# Patient Record
Sex: FEMALE | Race: BLACK OR AFRICAN AMERICAN | Employment: UNEMPLOYED | URBAN - METROPOLITAN AREA
[De-identification: names, ages, dates, MRNs, and addresses within clinical notes are randomized per-mention and may not be internally consistent; named-entity substitution may affect disease eponyms.]

---

## 2024-08-25 ENCOUNTER — HOSPITAL ENCOUNTER (EMERGENCY)
Facility: HOSPITAL | Age: 54
Discharge: HOME | End: 2024-08-25
Attending: EMERGENCY MEDICINE
Payer: MEDICAID

## 2024-08-25 ENCOUNTER — APPOINTMENT (OUTPATIENT)
Dept: GENERAL RADIOLOGY | Facility: HOSPITAL | Age: 54
End: 2024-08-25
Payer: MEDICAID

## 2024-08-25 VITALS
SYSTOLIC BLOOD PRESSURE: 135 MMHG | TEMPERATURE: 98 F | WEIGHT: 147 LBS | HEART RATE: 81 BPM | HEIGHT: 66 IN | BODY MASS INDEX: 23.63 KG/M2 | RESPIRATION RATE: 16 BRPM | DIASTOLIC BLOOD PRESSURE: 91 MMHG | OXYGEN SATURATION: 99 %

## 2024-08-25 DIAGNOSIS — R03.0 ELEVATED BLOOD PRESSURE READING: ICD-10-CM

## 2024-08-25 DIAGNOSIS — S93.402A SPRAIN OF LEFT ANKLE, UNSPECIFIED LIGAMENT, INITIAL ENCOUNTER: Primary | ICD-10-CM

## 2024-08-25 DIAGNOSIS — S40.011A CONTUSION OF RIGHT SHOULDER, INITIAL ENCOUNTER: ICD-10-CM

## 2024-08-25 PROCEDURE — 99284 EMERGENCY DEPT VISIT MOD MDM: CPT

## 2024-08-25 PROCEDURE — 73030 X-RAY EXAM OF SHOULDER: CPT

## 2024-08-25 PROCEDURE — 73610 X-RAY EXAM OF ANKLE: CPT

## 2024-08-26 NOTE — ED INITIAL ASSESSMENT (HPI)
Patient to ED with c/o  right shoulder pain and left ankle pain/swelling. Patient was climbing rocks and twisted ankle on rock and fell.  Incident happened yesterday at noon. Denies head/neck injuries      No blood thinners

## 2024-08-26 NOTE — ED PROVIDER NOTES
Patient Seen in: [facility] Emergency Department      History     Chief Complaint   Patient presents with    Leg or Foot Injury    Arm or Hand Injury     Stated Complaint: left ankle and right shoulder injury after fall in [place]; twisted ankle per*    Subjective:   54-year-old female, denies chronic past medical history, takes any medications, no blood thinner use, presents with right shoulder pain and left ankle pain.  Was hiking in [place] yesterday.  Stepped on a rock that was unstable and made her fall to her right side.  Twisted her left ankle and fell on her lateral right shoulder.  Some painful range of motion of the right shoulder, left lateral malleoli are inferior swelling of the left ankle.  Able to walk on it, she wearing crocs today.  She has no foot pain.  No knee pain.  No hip pain.  No chest pain cough or shortness of breath.  No prodromal symptoms.  No head or neck pain.            Objective:   History reviewed. No pertinent past medical history.           No pertinent past surgical history.              No pertinent social history.            Review of Systems   Constitutional:  Negative for fever.   Respiratory:  Negative for shortness of breath.    Cardiovascular:  Negative for chest pain.   Gastrointestinal:  Negative for abdominal pain.   Musculoskeletal:  Positive for arthralgias.   Neurological:  Negative for numbness.       Positive for stated Chief Complaint: Leg or Foot Injury and Arm or Hand Injury    Other systems are as noted in HPI.  Constitutional and vital signs reviewed.      All other systems reviewed and negative except as noted above.    Physical Exam     ED Triage Vitals [08/25/24 1955]   BP (!) 136/93   Pulse 70   Resp 16   Temp 98.2 °F (36.8 °C)   Temp src Oral   SpO2 99 %   O2 Device None (Room air)       Current Vitals:   Vital Signs  BP: (!) 136/93  Pulse: 70  Resp: 16  Temp: 98.2 °F (36.8 °C)  Temp src: Oral    Oxygen Therapy  SpO2: 99 %  O2 Device: None (Room  air)            Physical Exam  Vitals and nursing note reviewed.   Constitutional:       Appearance: She is not toxic-appearing.   HENT:      Head: Normocephalic and atraumatic.   Cardiovascular:      Rate and Rhythm: Normal rate.      Pulses: Normal pulses.   Pulmonary:      Effort: Pulmonary effort is normal. No respiratory distress.   Musculoskeletal:         General: Swelling, tenderness and signs of injury present. No deformity.      Cervical back: Neck supple.   Skin:     General: Skin is warm and dry.   Neurological:      General: No focal deficit present.      Mental Status: She is alert.      Sensory: No sensory deficit.      Coordination: Coordination normal.   Psychiatric:         Mood and Affect: Mood normal.         Behavior: Behavior normal.         Formulation of the right shoulder.  Some slight pain with abduction from 0 to 40 degrees.  She is tender over the right deltoid region near the AC joint.  No clavicle tenderness.  No proximal humerus tenderness.  No elbow forearm wrist or hand tenderness.  2+ radial pulses.   strength intact.  No neck pain or C-spine pain.  No scapular pain.  No rib pain.    Patient with some tenderness and swelling over the inferior border of the left lateral malleolus.  Minimal.  No pain over the fifth MTP.  No pain in the foot or calcaneus.  Cap refill intact.  Clinical DVT exam is negative.  No knee or hip pain.    ED Course   Labs Reviewed - No data to display                   MDM      XR ANKLE (MIN 3 VIEWS), LEFT (CPT=73610)    Result Date: 8/25/2024  CONCLUSION:  No acute fracture or dislocation.  Soft tissue swelling is noted.  LOCATION:  [facility]   Dictated by (CST): Patrick Adams MD on 8/25/2024 at 8:51 PM     Finalized by (CST): Patrick Adams MD on 8/25/2024 at 8:51 PM       XR SHOULDER, COMPLETE (MIN 2 VIEWS), RIGHT (CPT=73030)    Result Date: 8/25/2024  CONCLUSION:  No acute fracture or dislocation.  LOCATION:  [facility]   Dictated by (CST): Patrick Adams MD on  8/25/2024 at 8:44 PM     Finalized by (CST): Patrick Adams MD on 8/25/2024 at 8:50 PM        I independently interpreted the x-ray of the right shoulder without any obvious signs of acute fracture or dislocation    Differential diagnosis includes, but is not limited to, fracture, sprain, strain, dislocation, contusion    External chart review demonstrates outpatient visit with Abbie in 2021, nothing more recent review to other than ER visits infrequently    54-year-old female with right shoulder contusion and left ankle sprain.  She is walking okay on the ankle.  Slight limp.  Declines crutches.  Will put her in a Velcro ankle stirrup and recommend she wear sneakers or tight fitting shoes with this.  Heating and icing alternating, or keeping range of motion of the right shoulder.  Given orthopedic follow-up if needed and no improvement.  NSAIDs, icing and heating and supportive care at home peer return precaution provided, shared decision made utilized, discharge home at her request at this time.  Right-hand-dominant.    Patient was screened and evaluated during this visit.  As the treating physician attending to the patient, I determined within reasonable clinical confidence and prior to discharge, that an emergency medical condition was not or was no longer present.  There was no indication for further evaluation, treatment, or admission on an emergency basis.  Comprehensive verbal and written discharge and follow-up instructions were provided to help prevent relapse or worsening.  Patient was instructed to follow-up with their primary care provider for further evaluation and treatment, return immediately to ER for worsening, concerning, new, or changing/persisting symptoms. I discussed the case with the patient and they had no questions, complaints, or concerns.  Patient was comfortable going home.     Per the discharge paperwork, patients are encouraged to and given instructions on how to sign up for  [facility], where they have access to their records, including any/all incidental findings.     This note was prepared using Dragon Medical voice recognition dictation software. As a result errors may occur. When identified these errors have been corrected. While every attempt is made to correct errors during dictation discrepancies may still exist    Note to patient: The 21st Century Cures Act makes medical notes like these available to patients in the interest of transparency. However, this is a medical document intended as peer to peer communication. It is written in medical language and may contain abbreviations or verbiage that are unfamiliar. It may appear blunt or direct. Medical documents are intended to carry relevant information, facts as evident, and the clinical opinion of the practitioner.                              Medical Decision Making      Disposition and Plan     Clinical Impression:  1. Sprain of left ankle, unspecified ligament, initial encounter    2. Contusion of right shoulder, initial encounter    3. Elevated blood pressure reading         Disposition:  Discharge  8/25/2024  9:11 pm    Follow-up:  Carmela Richey MD  [address]  [address]  [facility] [address]  [phone]    Follow up  As needed          Medications Prescribed:  There are no discharge medications for this patient.